# Patient Record
Sex: MALE | Race: ASIAN | URBAN - METROPOLITAN AREA
[De-identification: names, ages, dates, MRNs, and addresses within clinical notes are randomized per-mention and may not be internally consistent; named-entity substitution may affect disease eponyms.]

---

## 2024-09-20 ENCOUNTER — OFFICE VISIT (OUTPATIENT)
Dept: URGENT CARE | Age: 34
End: 2024-09-20
Payer: COMMERCIAL

## 2024-09-20 VITALS
DIASTOLIC BLOOD PRESSURE: 72 MMHG | HEART RATE: 72 BPM | OXYGEN SATURATION: 98 % | SYSTOLIC BLOOD PRESSURE: 119 MMHG | RESPIRATION RATE: 16 BRPM

## 2024-09-20 DIAGNOSIS — R07.81 RIB PAIN ON RIGHT SIDE: Primary | ICD-10-CM

## 2024-09-20 DIAGNOSIS — R05.3 CHRONIC COUGH: ICD-10-CM

## 2024-09-20 RX ORDER — ALBUTEROL SULFATE 90 UG/1
2 INHALANT RESPIRATORY (INHALATION) EVERY 4 HOURS PRN
Qty: 8 G | Refills: 0 | Status: SHIPPED | OUTPATIENT
Start: 2024-09-20 | End: 2025-09-20

## 2024-09-20 RX ORDER — PREDNISONE 20 MG/1
40 TABLET ORAL DAILY
Qty: 10 TABLET | Refills: 0 | Status: SHIPPED | OUTPATIENT
Start: 2024-09-20 | End: 2024-09-25

## 2024-09-20 ASSESSMENT — ENCOUNTER SYMPTOMS
CARDIOVASCULAR NEGATIVE: 1
COUGH: 1
CONSTITUTIONAL NEGATIVE: 1
ARTHRALGIAS: 1
PAIN: 1

## 2024-09-20 NOTE — PROGRESS NOTES
Subjective   Patient ID: Marlyn Hall is a 34 y.o. male. They present today with a chief complaint of Cough and Pain (Right rib pain x 1 day).    History of Present Illness  A 34-year-old male arrives to clinic with chief complaint of cough and right-sided rib pain.  The patient reports having symptoms of an upper respiratory infection over the last month.  He reports that he has been taking care of it with over-the-counter medications.  He states that yesterday, he started having right-sided rib pain after he would cough.  He would have some pain as well when he would lift heavy object.  He denies any shortness of breath, chest pain, near syncope, dizziness.  He believes that it is muscle related.  Cough    Pain  Associated symptoms: cough        Past Medical History  Allergies as of 09/20/2024    (No Known Allergies)       (Not in a hospital admission)       History reviewed. No pertinent past medical history.    History reviewed. No pertinent surgical history.         Review of Systems  Review of Systems   Constitutional: Negative.    Respiratory:  Positive for cough.    Cardiovascular: Negative.    Musculoskeletal:  Positive for arthralgias.         Objective    Vitals:    09/20/24 1141   BP: 119/72   Pulse: 72   Resp: 16   SpO2: 98%     No LMP for male patient.    Physical Exam  Pulmonary:      Effort: Pulmonary effort is normal.      Breath sounds: Normal breath sounds and air entry.   Musculoskeletal:        Arms:       Comments: Right sided rib pain         Procedures    Point of Care Test & Imaging Results from this visit  No results found for this visit on 09/20/24.   No results found.    Diagnostic study results (if any) were reviewed by REFUGIO Drew.    Assessment/Plan   Allergies, medications, history, and pertinent labs/EKGs/Imaging reviewed by REFUGIO Drew.     Medical Decision Making  Upon initial assessment, the patient was sitting calmly bedside chair in no acute distress.   The patient does report right-sided rib pain that is tender upon palpation.  Lung sounds are equal and bilateral in all lobes.  No chest pain.  Lung sounds are clear.  Given his symptoms, this is likely intercostal pain/costochondritis due to the frequent coughing.  The patient did not cough once in the office.  I did send over albuterol and prednisone for symptoms.  Follow-up with your primary care provider.    As a result of the work-up, the patient was discharged home.  he was informed of his diagnosis and instructed to come back with any concerns or worsening of condition.  he and was agreeable to the plan as discussed above.  he was given the opportunity to ask questions.  All of the patient's questions were answered.    This document was generated using the assistance of voice recognition software. If there are any errors of spelling, grammar, syntax, or meaning; please feel free to contact me directly for clarification.     Orders and Diagnoses  Diagnoses and all orders for this visit:  Rib pain on right side  -     albuterol (Ventolin HFA) 90 mcg/actuation inhaler; Inhale 2 puffs every 4 hours if needed for wheezing or shortness of breath.  -     predniSONE (Deltasone) 20 mg tablet; Take 2 tablets (40 mg) by mouth once daily for 5 days.  Chronic cough  -     albuterol (Ventolin HFA) 90 mcg/actuation inhaler; Inhale 2 puffs every 4 hours if needed for wheezing or shortness of breath.  -     predniSONE (Deltasone) 20 mg tablet; Take 2 tablets (40 mg) by mouth once daily for 5 days.      Medical Admin Record      Patient disposition: Home    Electronically signed by REFUGIO Drew  11:52 AM